# Patient Record
(demographics unavailable — no encounter records)

---

## 2024-11-11 NOTE — HISTORY OF PRESENT ILLNESS
[FreeTextEntry1] : The patient is a 64 year old female with a PMH of T2DM, HTN, hep B, CAD, and s/p PPM. She presents for evaluation ofdizziness.